# Patient Record
Sex: FEMALE | URBAN - METROPOLITAN AREA
[De-identification: names, ages, dates, MRNs, and addresses within clinical notes are randomized per-mention and may not be internally consistent; named-entity substitution may affect disease eponyms.]

---

## 2024-08-23 ENCOUNTER — OFFICE VISIT (OUTPATIENT)
Dept: URBAN - METROPOLITAN AREA CLINIC 19 | Facility: CLINIC | Age: 69
End: 2024-08-23

## 2024-10-09 ENCOUNTER — OFFICE VISIT (OUTPATIENT)
Dept: URBAN - METROPOLITAN AREA CLINIC 19 | Facility: CLINIC | Age: 69
End: 2024-10-09
Payer: COMMERCIAL

## 2024-10-09 VITALS
BODY MASS INDEX: 23.42 KG/M2 | HEART RATE: 74 BPM | HEIGHT: 67 IN | DIASTOLIC BLOOD PRESSURE: 100 MMHG | SYSTOLIC BLOOD PRESSURE: 132 MMHG | WEIGHT: 149.2 LBS | TEMPERATURE: 96.8 F

## 2024-10-09 DIAGNOSIS — Z12.11 SCREEN FOR COLON CANCER: ICD-10-CM

## 2024-10-09 PROCEDURE — 99242 OFF/OP CONSLTJ NEW/EST SF 20: CPT | Performed by: NURSE PRACTITIONER

## 2024-10-09 RX ORDER — POLYETHYLENE GLYCOL 3350, SODIUM SULFATE, SODIUM CHLORIDE, POTASSIUM CHLORIDE, ASCORBIC ACID, SODIUM ASCORBATE 140-9-5.2G
177ML KIT ORAL
Qty: 1 UNSPECIFIED | Refills: 0 | OUTPATIENT
Start: 2024-10-09 | End: 2024-10-10

## 2024-10-09 NOTE — HPI-TODAY'S VISIT:
70 yo female with PMH of HTN, cholecystectomy in 1988, back surgery in 1999 presents for screening colonoscopy. Sent upon referral from Dr. Luis De La O. A copy of this report will be sent to the referring provider. Referred to Dr. Carson  She reports her last colonoscopy from age 49 and age 59 were both normal - both done with Dr. Patel.   Mother had breast cancer and kidney cancer. Father passed away from pancreatic cancer. Brother had lung cancer. No colon cancer or colon polyps in the family.  No concerns with her bowel habits. Has a regular normal BM daily. No abdominal pain. No blood in stool.    No pacemaker/stents. No blood thinners. No pulm or kidney disease.

## 2024-10-29 ENCOUNTER — OFFICE VISIT (OUTPATIENT)
Dept: URBAN - METROPOLITAN AREA SURGERY CENTER 31 | Facility: SURGERY CENTER | Age: 69
End: 2024-10-29